# Patient Record
Sex: MALE | URBAN - METROPOLITAN AREA
[De-identification: names, ages, dates, MRNs, and addresses within clinical notes are randomized per-mention and may not be internally consistent; named-entity substitution may affect disease eponyms.]

---

## 2018-02-01 ENCOUNTER — APPOINTMENT (RX ONLY)
Dept: URBAN - METROPOLITAN AREA CLINIC 87 | Facility: CLINIC | Age: 53
Setting detail: DERMATOLOGY
End: 2018-02-01

## 2018-02-01 DIAGNOSIS — L40.0 PSORIASIS VULGARIS: ICD-10-CM

## 2018-02-01 PROCEDURE — ? OTHER

## 2018-02-01 PROCEDURE — ? COUNSELING

## 2018-02-01 PROCEDURE — 99202 OFFICE O/P NEW SF 15 MIN: CPT

## 2018-02-01 ASSESSMENT — LOCATION SIMPLE DESCRIPTION DERM
LOCATION SIMPLE: LEFT FOREARM
LOCATION SIMPLE: RIGHT EYEBROW
LOCATION SIMPLE: LEFT PRETIBIAL REGION
LOCATION SIMPLE: RIGHT FOREARM
LOCATION SIMPLE: RIGHT ANKLE
LOCATION SIMPLE: LEFT THIGH
LOCATION SIMPLE: ABDOMEN
LOCATION SIMPLE: LEFT FOOT
LOCATION SIMPLE: RIGHT PRETIBIAL REGION

## 2018-02-01 ASSESSMENT — LOCATION ZONE DERM
LOCATION ZONE: FACE
LOCATION ZONE: FEET
LOCATION ZONE: ARM
LOCATION ZONE: TRUNK
LOCATION ZONE: LEG

## 2018-02-01 ASSESSMENT — LOCATION DETAILED DESCRIPTION DERM
LOCATION DETAILED: LEFT PROXIMAL DORSAL FOREARM
LOCATION DETAILED: RIGHT PROXIMAL DORSAL FOREARM
LOCATION DETAILED: RIGHT PROXIMAL PRETIBIAL REGION
LOCATION DETAILED: RIGHT ANKLE
LOCATION DETAILED: LEFT ANTERIOR PROXIMAL THIGH
LOCATION DETAILED: LEFT PROXIMAL PRETIBIAL REGION
LOCATION DETAILED: LEFT DORSAL FOOT
LOCATION DETAILED: PERIUMBILICAL SKIN
LOCATION DETAILED: RIGHT MEDIAL EYEBROW

## 2018-02-01 NOTE — PROCEDURE: OTHER
Detail Level: Generalized
Other (Free Text): Discontinue Remicade and methotrexate. Discussed weight loss and the positive side effects it will have on his diabetes and psoriasis
Note Text (......Xxx Chief Complaint.): This diagnosis correlates with the

## 2018-02-21 ENCOUNTER — APPOINTMENT (RX ONLY)
Dept: URBAN - METROPOLITAN AREA CLINIC 87 | Facility: CLINIC | Age: 53
Setting detail: DERMATOLOGY
End: 2018-02-21

## 2018-02-21 DIAGNOSIS — L40.0 PSORIASIS VULGARIS: ICD-10-CM

## 2018-02-21 PROCEDURE — ? TREMFYA INJECTION

## 2018-02-21 PROCEDURE — 96372 THER/PROPH/DIAG INJ SC/IM: CPT

## 2018-02-21 PROCEDURE — ? COUNSELING

## 2018-02-21 PROCEDURE — ? BIOLOGIC INJECTION TRAINING

## 2018-02-21 ASSESSMENT — LOCATION DETAILED DESCRIPTION DERM: LOCATION DETAILED: PERIUMBILICAL SKIN

## 2018-02-21 ASSESSMENT — LOCATION ZONE DERM: LOCATION ZONE: TRUNK

## 2018-02-21 ASSESSMENT — LOCATION SIMPLE DESCRIPTION DERM: LOCATION SIMPLE: ABDOMEN

## 2018-02-21 NOTE — HPI: MEDICATION (TREMFYA)
How Severe Is It?: severe
Is This A New Presentation, Or A Follow-Up?: Follow Up Tremfya
How Many Months Of Tremfya Have You Completed?: 0

## 2018-02-21 NOTE — PROCEDURE: TREMFYA INJECTION
Tremfya Amount: 100 mg
Detail Level: None
Lot # (Optional): [id number]
Expiration Date (Optional): 04/2019
Administered By (Optional): Renee Ribeiro PA-C
Consent: The risks of pain and injection site reactions were reviewed with the patient prior to the injection.

## 2018-02-21 NOTE — PROCEDURE: BIOLOGIC INJECTION TRAINING
Who Did You Train: The Patient
Which Biologic Is The Patient Receiving Training For?: Tremfya
Cosentyx Training Text: We discussed Cosentyx injection.  I demonstrated how to clean the skin and administer the medication.
Detail Level: Simple
Dupixent Training Text: We discussed Dupixent injection.  I demonstrated how to clean the skin and administer the medication.
Enbrel Training Text: We discussed Enbrel injection.  I demonstrated how to clean the skin and administer the medication.
Humira Training Text: We discussed Humira injection.  I demonstrated how to clean the skin and administer the medication.
Siliq Training Text: We discussed Siliq injection.  I demonstrated how to clean the skin and administer the medication.
Stelara Training Text: We discussed Stelara injection.  I demonstrated how to clean the skin and administer the medication.
Taltz Training Text: We discussed Taltz injection.  I demonstrated how to clean the skin and administer the medication.
Tremfya Training Text: We discussed Tremfya injection.  I demonstrated how to clean the skin and administer the medication.

## 2018-02-26 ENCOUNTER — RX ONLY (OUTPATIENT)
Age: 53
Setting detail: RX ONLY
End: 2018-02-26

## 2018-02-26 RX ORDER — CLOBETASOL PROPIONATE 0.5 MG/G
OINTMENT TOPICAL
Qty: 1 | Refills: 2 | Status: ERX | COMMUNITY
Start: 2018-02-26

## 2018-03-21 ENCOUNTER — APPOINTMENT (RX ONLY)
Dept: URBAN - METROPOLITAN AREA CLINIC 87 | Facility: CLINIC | Age: 53
Setting detail: DERMATOLOGY
End: 2018-03-21

## 2018-03-21 DIAGNOSIS — L40.0 PSORIASIS VULGARIS: ICD-10-CM

## 2018-03-21 PROCEDURE — ? TREMFYA INJECTION

## 2018-03-21 PROCEDURE — 96372 THER/PROPH/DIAG INJ SC/IM: CPT

## 2018-03-21 PROCEDURE — ? BIOLOGIC INJECTION TRAINING

## 2018-03-21 ASSESSMENT — LOCATION ZONE DERM: LOCATION ZONE: ARM

## 2018-03-21 ASSESSMENT — LOCATION SIMPLE DESCRIPTION DERM: LOCATION SIMPLE: LEFT UPPER ARM

## 2018-03-21 ASSESSMENT — LOCATION DETAILED DESCRIPTION DERM: LOCATION DETAILED: LEFT PROXIMAL POSTERIOR UPPER ARM

## 2018-03-21 NOTE — PROCEDURE: BIOLOGIC INJECTION TRAINING
Stelara Training Text: We discussed Stelara injection.  I demonstrated how to clean the skin and administer the medication.
Enbrel Training Text: We discussed Enbrel injection.  I demonstrated how to clean the skin and administer the medication.
Taltz Training Text: We discussed Taltz injection.  I demonstrated how to clean the skin and administer the medication.
Cosentyx Training Text: We discussed Cosentyx injection.  I demonstrated how to clean the skin and administer the medication.
Siliq Training Text: We discussed Siliq injection.  I demonstrated how to clean the skin and administer the medication.
Detail Level: Simple
Who Did You Train: The Patient
Humira Training Text: We discussed Humira injection.  I demonstrated how to clean the skin and administer the medication.
Tremfya Training Text: We discussed Tremfya injection.  I demonstrated how to clean the skin and administer the medication.
Dupixent Training Text: We discussed Dupixent injection.  I demonstrated how to clean the skin and administer the medication.
Which Biologic Is The Patient Receiving Training For?: Tremfya

## 2018-03-21 NOTE — PROCEDURE: TREMFYA INJECTION
Lot # (Optional): [id number]
Treatment Number (Optional): 2
Consent: The risks of pain and injection site reactions were reviewed with the patient prior to the injection.
Detail Level: None
Tremfya Amount: 100 mg
Expiration Date (Optional): 08/2019
Administered By (Optional): Renee Ribeiro PA-C

## 2018-05-01 ENCOUNTER — RX ONLY (OUTPATIENT)
Age: 53
Setting detail: RX ONLY
End: 2018-05-01

## 2018-05-01 RX ORDER — GUSELKUMAB 100 MG/ML
INJECTION SUBCUTANEOUS
Qty: 1 | Refills: 0 | Status: ERX | COMMUNITY
Start: 2018-05-01

## 2018-06-29 ENCOUNTER — RX ONLY (OUTPATIENT)
Age: 53
Setting detail: RX ONLY
End: 2018-06-29

## 2018-06-29 RX ORDER — TRIAMCINOLONE ACETONIDE 1 MG/G
OINTMENT TOPICAL
Qty: 1 | Refills: 0 | Status: ERX | COMMUNITY
Start: 2018-06-29

## 2018-07-09 ENCOUNTER — RX ONLY (OUTPATIENT)
Age: 53
Setting detail: RX ONLY
End: 2018-07-09

## 2018-07-09 ENCOUNTER — APPOINTMENT (RX ONLY)
Dept: URBAN - METROPOLITAN AREA CLINIC 87 | Facility: CLINIC | Age: 53
Setting detail: DERMATOLOGY
End: 2018-07-09

## 2018-07-09 DIAGNOSIS — L40.0 PSORIASIS VULGARIS: ICD-10-CM

## 2018-07-09 PROCEDURE — ? ORDER TESTS

## 2018-07-09 PROCEDURE — 99213 OFFICE O/P EST LOW 20 MIN: CPT

## 2018-07-09 PROCEDURE — ? TREATMENT REGIMEN

## 2018-07-09 PROCEDURE — ? TREMFYA MONITORING

## 2018-07-09 PROCEDURE — ? PRESCRIPTION

## 2018-07-09 PROCEDURE — ? COUNSELING

## 2018-07-09 PROCEDURE — ? OTHER

## 2018-07-09 RX ORDER — DICAPRYLYL CARBONATE/DIMETH
SPRAY, NON-AEROSOL (ML) TOPICAL
Qty: 1 | Refills: 3 | Status: ERX | COMMUNITY
Start: 2018-07-09

## 2018-07-09 RX ORDER — GUSELKUMAB 100 MG/ML
INJECTION SUBCUTANEOUS
Qty: 1 | Refills: 3 | Status: ERX

## 2018-07-09 RX ADMIN — Medication: at 16:39

## 2018-07-09 ASSESSMENT — LOCATION SIMPLE DESCRIPTION DERM
LOCATION SIMPLE: RIGHT UPPER BACK
LOCATION SIMPLE: RIGHT FOREARM
LOCATION SIMPLE: ABDOMEN
LOCATION SIMPLE: LEFT FOREARM

## 2018-07-09 ASSESSMENT — LOCATION ZONE DERM
LOCATION ZONE: TRUNK
LOCATION ZONE: ARM

## 2018-07-09 ASSESSMENT — LOCATION DETAILED DESCRIPTION DERM
LOCATION DETAILED: RIGHT MID-UPPER BACK
LOCATION DETAILED: LEFT DISTAL ULNAR DORSAL FOREARM
LOCATION DETAILED: EPIGASTRIC SKIN
LOCATION DETAILED: RIGHT PROXIMAL DORSAL FOREARM

## 2018-07-09 NOTE — PROCEDURE: TREMFYA MONITORING
Length Of Therapy: 3 months
Add High Risk Medication Management Associated Diagnosis?: No
Detail Level: Zone

## 2018-07-09 NOTE — PROCEDURE: TREATMENT REGIMEN
Action 3: Continue
Other Instructions: Patient given lab slip to have drawn in August
Detail Level: Zone
Continue Regimen: Tremfya

## 2018-07-09 NOTE — HPI: MEDICATION (TREMFYA)
How Severe Is It?: mild
Is This A New Presentation, Or A Follow-Up?: Follow Up Tremfya
How Many Months Of Tremfya Have You Completed?: 3

## 2018-07-09 NOTE — PROCEDURE: OTHER
Note Text (......Xxx Chief Complaint.): This diagnosis correlates with the
Detail Level: Zone
Other (Free Text): Per patient he has noticed some improvement with the medication as he is able to now see some skin on his arms, and back of legs.

## 2018-07-11 ENCOUNTER — RX ONLY (OUTPATIENT)
Age: 53
Setting detail: RX ONLY
End: 2018-07-11

## 2018-07-11 RX ORDER — DICAPRYLYL CARBONATE/DIMETH
SPRAY, NON-AEROSOL (ML) TOPICAL
Qty: 1 | Refills: 3 | Status: ERX